# Patient Record
Sex: FEMALE | Race: WHITE | NOT HISPANIC OR LATINO | Employment: FULL TIME | URBAN - NONMETROPOLITAN AREA
[De-identification: names, ages, dates, MRNs, and addresses within clinical notes are randomized per-mention and may not be internally consistent; named-entity substitution may affect disease eponyms.]

---

## 2023-03-10 ENCOUNTER — TELEPHONE (OUTPATIENT)
Dept: PRIMARY CARE | Facility: CLINIC | Age: 58
End: 2023-03-10

## 2023-03-10 NOTE — TELEPHONE ENCOUNTER
Patient called in she needs a refill on Levothyroxine Sodium  and Vitamin D weekly 5000 ut sent to [facility] in [place].  Please call when they have been sent over [phone]

## 2023-03-15 PROBLEM — Z23 NEED FOR SHINGLES VACCINE: Status: ACTIVE | Noted: 2023-03-15

## 2023-03-15 PROBLEM — E66.9 CLASS 1 OBESITY WITH BODY MASS INDEX (BMI) OF 31.0 TO 31.9 IN ADULT: Status: ACTIVE | Noted: 2023-03-15

## 2023-03-15 PROBLEM — N60.01 BREAST CYST, RIGHT: Status: ACTIVE | Noted: 2023-03-15

## 2023-03-15 PROBLEM — R93.89 ABNORMAL CXR (CHEST X-RAY): Status: ACTIVE | Noted: 2023-03-15

## 2023-03-15 PROBLEM — R94.31 ABNORMAL EKG: Status: ACTIVE | Noted: 2023-03-15

## 2023-03-15 PROBLEM — E66.9 CLASS 1 OBESITY WITH BODY MASS INDEX (BMI) OF 33.0 TO 33.9 IN ADULT: Status: ACTIVE | Noted: 2023-03-15

## 2023-03-15 PROBLEM — E66.811 CLASS 1 OBESITY WITH BODY MASS INDEX (BMI) OF 31.0 TO 31.9 IN ADULT: Status: ACTIVE | Noted: 2023-03-15

## 2023-03-15 PROBLEM — R92.8 ABNORMAL MAMMOGRAM: Status: ACTIVE | Noted: 2023-03-15

## 2023-03-15 PROBLEM — K57.32 DIVERTICULITIS, COLON: Status: ACTIVE | Noted: 2023-03-15

## 2023-03-15 PROBLEM — E03.9 HYPOTHYROID: Status: ACTIVE | Noted: 2023-03-15

## 2023-03-15 PROBLEM — E66.811 CLASS 1 OBESITY WITH BODY MASS INDEX (BMI) OF 33.0 TO 33.9 IN ADULT: Status: ACTIVE | Noted: 2023-03-15

## 2023-03-15 PROBLEM — E55.9 VITAMIN D DEFICIENCY: Status: ACTIVE | Noted: 2023-03-15

## 2023-03-15 PROBLEM — Z20.822 SUSPECTED COVID-19 VIRUS INFECTION: Status: ACTIVE | Noted: 2023-03-15

## 2023-03-15 PROBLEM — N28.89 RENAL MASS: Status: ACTIVE | Noted: 2023-03-15

## 2023-03-15 RX ORDER — ALBUTEROL SULFATE 90 UG/1
AEROSOL, METERED RESPIRATORY (INHALATION)
COMMUNITY
Start: 2015-08-26

## 2023-03-15 RX ORDER — ACETAMINOPHEN 500 MG
1 TABLET ORAL DAILY
COMMUNITY
Start: 2022-06-21

## 2023-03-15 RX ORDER — ERGOCALCIFEROL 1.25 MG/1
1 CAPSULE ORAL
COMMUNITY
Start: 2022-06-22 | End: 2023-09-22 | Stop reason: WASHOUT

## 2023-03-15 RX ORDER — LEVOTHYROXINE SODIUM 50 UG/1
1 TABLET ORAL DAILY
COMMUNITY
Start: 2022-01-20 | End: 2023-05-04 | Stop reason: SDUPTHER

## 2023-03-15 NOTE — PROGRESS NOTES
Subjective   Patient ID: Caprice Cuenca is a 58 y.o. female who presents for No chief complaint on file..    HPI       Pt is here for f/up thyroid, vit d and elevated BMI      Which cardiologist did pt see for her abnormal EKG?___      She is doing well       8/2019 colonoscopy Dr Alberts- when is she due?_____  Mother had colon cancer      Due for right breast ultrasound- Dx right breast cyst       Due for lab      GYN Dr Veras    Has pt had DXA scan from Dr Veras?_____      Tdap 2016      Has had one Shingrix         Review of Systems    Objective   There were no vitals taken for this visit.    Physical Exam      Pt is A and O x3, NAD  Head- normocephalic and atraumatic,   EYES- conjunctiva- normal   lids- normal  EARS/NOSE- TM's normal, nasopharynx- normal and atraumatic  OROPHARYNX- normal  NECK- supple, FROM  THYROID- NT, normal size, no nodule noted  LYMPH- no cervical lymph nodes palpated   CV- RRR without murmur  PULM- CTA bilaterally, normal respiratory effort  RESPIRATORY EFFORT- normal , no retractions or nasal flaring   ABD- normoactive BS's , soft , NT, no hepatosplenomegaly palpated  EXT- no edema,NT  SKIN- no abnormal skin lesions noted  NEURO- no focal deficits  PSYCH- pleasant, normal judgement and insight       The number and complexity of problems addressed is considered low.  The amount and/or complexity of data reviewed and analyzed is considered low. The risk of complications and/or morbidity/mortality of patient is considered low. Overall, this patient encounter is considered a low risk visit.            Assessment/Plan       Ultrasound ordered         Ordered lab        Follow up in 12 months

## 2023-03-17 ENCOUNTER — OFFICE VISIT (OUTPATIENT)
Dept: PRIMARY CARE | Facility: CLINIC | Age: 58
End: 2023-03-17
Payer: COMMERCIAL

## 2023-03-17 VITALS
OXYGEN SATURATION: 97 % | SYSTOLIC BLOOD PRESSURE: 127 MMHG | WEIGHT: 216.9 LBS | BODY MASS INDEX: 35.01 KG/M2 | DIASTOLIC BLOOD PRESSURE: 84 MMHG | TEMPERATURE: 97.6 F | HEART RATE: 68 BPM

## 2023-03-17 DIAGNOSIS — H93.13 TINNITUS AURIUM, BILATERAL: ICD-10-CM

## 2023-03-17 DIAGNOSIS — R94.31 ABNORMAL EKG: ICD-10-CM

## 2023-03-17 DIAGNOSIS — Z11.59 ENCOUNTER FOR HEPATITIS C SCREENING TEST FOR LOW RISK PATIENT: ICD-10-CM

## 2023-03-17 DIAGNOSIS — Z13.820 SCREENING FOR OSTEOPOROSIS: ICD-10-CM

## 2023-03-17 DIAGNOSIS — N60.01 BREAST CYST, RIGHT: Primary | ICD-10-CM

## 2023-03-17 DIAGNOSIS — E03.9 HYPOTHYROIDISM, UNSPECIFIED TYPE: ICD-10-CM

## 2023-03-17 DIAGNOSIS — Z13.1 SCREENING FOR DIABETES MELLITUS: ICD-10-CM

## 2023-03-17 DIAGNOSIS — E55.9 VITAMIN D DEFICIENCY: ICD-10-CM

## 2023-03-17 DIAGNOSIS — Z13.220 NEED FOR LIPID SCREENING: ICD-10-CM

## 2023-03-17 PROBLEM — Z12.11 SCREEN FOR COLON CANCER: Status: ACTIVE | Noted: 2023-03-17

## 2023-03-17 PROCEDURE — 99214 OFFICE O/P EST MOD 30 MIN: CPT | Performed by: FAMILY MEDICINE

## 2023-03-17 PROCEDURE — 1036F TOBACCO NON-USER: CPT | Performed by: FAMILY MEDICINE

## 2023-03-17 NOTE — PROGRESS NOTES
Subjective   Patient ID: Caprice Cuenca is a 58 y.o. female who presents for Follow-up (Follow up meds, levothyroxine, states doing well no complaints).    HPI       Pt is here for f/up thyroid, vit d and elevated BMI      Which cardiologist did pt see for her abnormal EKG?___  Forgot to make appt.    She is doing well   Pt c/o sudden onset tinnitis L>R       8/2019 colonoscopy Dr Alberts- when is she due?_____  Mother had colon cancer      Due for right breast ultrasound- Dx right breast cyst       Due for lab      GYN Dr Veras    Has pt had DXA scan from Dr Veras?___no __      Tdap 2016      Has had one Shingrix - pt will return at later date         Review of Systems    Objective   /84 (BP Location: Right arm, Patient Position: Sitting, BP Cuff Size: Adult)   Pulse 68   Temp 36.4 °C (97.6 °F) (Temporal)   Wt 98.4 kg (216 lb 14.4 oz)   SpO2 97%   BMI 35.01 kg/m²     Physical Exam      Pt is A and O x3, NAD  Head- normocephalic and atraumatic,   EYES- conjunctiva- normal   lids- normal  EARS/NOSE- TM's normal, nasopharynx- normal and atraumatic  OROPHARYNX- normal  NECK- supple, FROM  THYROID- NT, normal size, no nodule noted  LYMPH- no cervical lymph nodes palpated   CV- RRR without murmur  PULM- CTA bilaterally, normal respiratory effort  RESPIRATORY EFFORT- normal , no retractions or nasal flaring   ABD- normoactive BS's , soft , NT, no hepatosplenomegaly palpated  EXT- no edema,NT  SKIN- no abnormal skin lesions noted  NEURO- no focal deficits  PSYCH- pleasant, normal judgement and insight       The number and complexity of problems addressed is considered low.  The amount and/or complexity of data reviewed and analyzed is considered low. The risk of complications and/or morbidity/mortality of patient is considered low. Overall, this patient encounter is considered a low risk visit.            Assessment/Plan       Ultrasound ordered         Ordered lab        Ordered DXA           Refer to Cardiologist        Refer ENT         Follow up in 6 months

## 2023-03-20 ENCOUNTER — LAB (OUTPATIENT)
Dept: LAB | Facility: LAB | Age: 58
End: 2023-03-20
Payer: COMMERCIAL

## 2023-03-20 DIAGNOSIS — Z13.1 SCREENING FOR DIABETES MELLITUS: ICD-10-CM

## 2023-03-20 DIAGNOSIS — Z13.220 NEED FOR LIPID SCREENING: ICD-10-CM

## 2023-03-20 DIAGNOSIS — E03.9 HYPOTHYROIDISM, UNSPECIFIED TYPE: ICD-10-CM

## 2023-03-20 DIAGNOSIS — E55.9 VITAMIN D DEFICIENCY: ICD-10-CM

## 2023-03-20 LAB
CALCIDIOL (25 OH VITAMIN D3) (NG/ML) IN SER/PLAS: 31 NG/ML
CHOLESTEROL (MG/DL) IN SER/PLAS: 190 MG/DL (ref 0–199)
CHOLESTEROL IN HDL (MG/DL) IN SER/PLAS: 57 MG/DL
CHOLESTEROL/HDL RATIO: 3.3
GLUCOSE (MG/DL) IN SER/PLAS: 99 MG/DL (ref 74–99)
LDL: 100 MG/DL (ref 0–99)
THYROTROPIN (MIU/L) IN SER/PLAS BY DETECTION LIMIT <= 0.05 MIU/L: 2.84 MIU/L (ref 0.44–3.98)
TRIGLYCERIDE (MG/DL) IN SER/PLAS: 164 MG/DL (ref 0–149)
VLDL: 33 MG/DL (ref 0–40)

## 2023-03-20 PROCEDURE — 80061 LIPID PANEL: CPT

## 2023-03-20 PROCEDURE — 36415 COLL VENOUS BLD VENIPUNCTURE: CPT

## 2023-03-20 PROCEDURE — 82306 VITAMIN D 25 HYDROXY: CPT

## 2023-03-20 PROCEDURE — 84443 ASSAY THYROID STIM HORMONE: CPT

## 2023-03-20 PROCEDURE — 82947 ASSAY GLUCOSE BLOOD QUANT: CPT

## 2023-04-21 ENCOUNTER — TELEPHONE (OUTPATIENT)
Dept: PRIMARY CARE | Facility: CLINIC | Age: 58
End: 2023-04-21

## 2023-04-21 NOTE — TELEPHONE ENCOUNTER
The patient while scheduling her referrals needs an updated referral for the Bilateral US Breast referral. It has since .  The patient is also asking about her Thyroid medication. She realizes her levels were normal,but not sure if she should be taking her medication?    Please call patient at the number listed

## 2023-04-24 RX ORDER — LEVOTHYROXINE SODIUM 50 UG/1
50 TABLET ORAL DAILY
Qty: 90 TABLET | Refills: 1 | Status: CANCELLED | OUTPATIENT
Start: 2023-04-24

## 2023-04-24 NOTE — TELEPHONE ENCOUNTER
Spoke with patient and gave her information, refill needed for thyroid meds and that was sent to pcp

## 2023-05-04 DIAGNOSIS — E03.9 HYPOTHYROIDISM, UNSPECIFIED TYPE: Primary | ICD-10-CM

## 2023-05-04 RX ORDER — LEVOTHYROXINE SODIUM 50 UG/1
50 TABLET ORAL DAILY
Qty: 90 TABLET | Refills: 1 | Status: SHIPPED | OUTPATIENT
Start: 2023-05-04 | End: 2023-09-22 | Stop reason: SDUPTHER

## 2023-07-28 DIAGNOSIS — Z12.31 ENCOUNTER FOR SCREENING MAMMOGRAM FOR MALIGNANT NEOPLASM OF BREAST: Primary | ICD-10-CM

## 2023-09-20 PROBLEM — R94.31 ABNORMAL EKG: Chronic | Status: ACTIVE | Noted: 2023-03-15

## 2023-09-20 PROBLEM — Z11.59 ENCOUNTER FOR HEPATITIS C SCREENING TEST FOR LOW RISK PATIENT: Chronic | Status: ACTIVE | Noted: 2023-03-17

## 2023-09-20 PROBLEM — Z12.11 SCREEN FOR COLON CANCER: Chronic | Status: ACTIVE | Noted: 2023-03-17

## 2023-09-20 NOTE — PROGRESS NOTES
Subjective      HPI:          Caprice Cuenca is a 58 y.o. female 58 y.o. is here today for PE/health maintenance      Chief Complaint   Patient presents with    Annual Exam    Immunizations     Declined.  Will get at a later time.     Did pt have the DXA study done yet?  No     due Shingrix #2- declines today      History Breast cyst- had ultrasound July      Due mammo        Tdap 2016      Pt also due for f/up chronic medical problems-thyroid, elevated BMI    Other medical specialists are involved in patient's care.    Any recent notes that were available were reviewed.    All conditions are monitored, evaluated and assessed regularly.    Patient is compliant with current treatment regimen/medications.    Specialists involved include:      Dr Veras-GYN      Dr Baires- tinnitus    Dr Alberts- 2019 colonoscopy- (mother history colon cancer)       USPTFS recommend  laboratory  screening for HIV in patients ages 15-65        Health Maintenance Topics with due status: Overdue       Topic Date Due    Yearly Adult Physical Never done    Hepatitis B Vaccines Never done    HIV Screening Never done    MMR Vaccines Never done    Cervical Cancer Screening Never done    Zoster Vaccines 08/10/2021    COVID-19 Vaccine 07/23/2022    Mammogram 08/02/2023    Influenza Vaccine 09/01/2023     Health Maintenance Topics with due status: Not Due       Topic Last Completion Date    DTaP/Tdap/Td Vaccines 12/02/2016    Colorectal Cancer Screening 08/29/2019    TSH Level 03/20/2023    Lipid Panel 03/20/2023    Diabetes Screening 03/20/2023     Health Maintenance Topics with due status: Completed       Topic Last Completion Date    Hepatitis C Screening 12/09/2019     Health Maintenance Topics with due status: Aged Out       Topic Date Due    HIB Vaccines Aged Out    IPV Vaccines Aged Out    Hepatitis A Vaccines Aged Out    Meningococcal Vaccine Aged Out    Rotavirus Vaccines Aged Out    HPV Vaccines Aged Out    Pneumococcal Vaccine: Pediatrics  (0 to 5 Years) and At-Risk Patients (6 to 64 Years) Aged Out     Health Maintenance Topics with due status: Discontinued       Topic Date Due    Irritable Bowel Syndrome Discontinued                 Immunization History   Administered Date(s) Administered    Influenza, seasonal, injectable 01/20/2022    Moderna SARS-CoV-2 Vaccination 11/20/2021, 05/28/2022    Pfizer Purple Cap SARS-CoV-2 03/19/2021, 04/08/2021    Tdap vaccine, age 7 year and older (BOOSTRIX) 12/02/2016    Zoster vaccine, recombinant, adult (SHINGRIX) 06/15/2021         Social History     Tobacco Use   Smoking Status Never    Passive exposure: Past   Smokeless Tobacco Never                reports current alcohol use of about 2.0 standard drinks of alcohol per week.       [facility] Encounter on 05/16/2023   Component Date Value Ref Range Status    Ventricular Rate 05/16/2023 68  BPM Final    Atrial Rate 05/16/2023 68  BPM Final    DC Interval 05/16/2023 154  ms Final    QRS Duration 05/16/2023 80  ms Final    QT Interval 05/16/2023 370  ms Final    QTC Calculation(Bazett) 05/16/2023 393  ms Final    P Axis 05/16/2023 45  degrees Final    R Axis 05/16/2023 42  degrees Final    T Axis 05/16/2023 33  degrees Final    QRS Count 05/16/2023 11  beats Final    Q Onset 05/16/2023 226  ms Final    P Onset 05/16/2023 149  ms Final    P Offset 05/16/2023 201  ms Final    T Offset 05/16/2023 411  ms Final    QTC Fredericia 05/16/2023 386  ms Final   Lab on 03/20/2023   Component Date Value Ref Range Status    Glucose 03/20/2023 99  74 - 99 mg/dL Final    Cholesterol 03/20/2023 190  0 - 199 mg/dL Final    .      AGE      DESIRABLE   BORDERLINE HIGH   HIGH     0-19 Y     0 - 169       170 - 199     >/= 200    20-24 Y     0 - 189       190 - 224     >/= 225         >24 Y     0 - 199       200 - 239     >/= 240   **All ranges are based on fasting samples. Specific   therapeutic targets will vary based on patient-specific   cardiac risk.  .   Pediatric guidelines  reference:Pediatrics 2011, 128(S5).   Adult guidelines reference: NCEP ATPIII Guidelines,     LAUREN 2001, 258:4576-53  .   Venipuncture immediately after or during the    administration of Metamizole may lead to falsely   low results. Testing should be performed immediately   prior to Metamizole dosing.    HDL 03/20/2023 57.0  mg/dL Final    .      AGE      VERY LOW   LOW     NORMAL    HIGH       0-19 Y       < 35   < 40     40-45     ----    20-24 Y       ----   < 40       >45     ----      >24 Y       ----   < 40     40-60      >60  .    Cholesterol/HDL Ratio 03/20/2023 3.3   Final    REF VALUES  DESIRABLE  < 3.4  HIGH RISK  > 5.0    LDL 03/20/2023 100 (H)  0 - 99 mg/dL Final    .                           NEAR      BORD      AGE      DESIRABLE  OPTIMAL    HIGH     HIGH     VERY HIGH     0-19 Y     0 - 109     ---    110-129   >/= 130     ----    20-24 Y     0 - 119     ---    120-159   >/= 160     ----      >24 Y     0 -  99   100-129  130-159   160-189     >/=190  .    VLDL 03/20/2023 33  0 - 40 mg/dL Final    Triglycerides 03/20/2023 164 (H)  0 - 149 mg/dL Final    .      AGE      DESIRABLE   BORDERLINE HIGH   HIGH     VERY HIGH   0 D-90 D    19 - 174         ----         ----        ----  91 D- 9 Y     0 -  74        75 -  99     >/= 100      ----    10-19 Y     0 -  89        90 - 129     >/= 130      ----    20-24 Y     0 - 114       115 - 149     >/= 150      ----         >24 Y     0 - 149       150 - 199    200- 499    >/= 500  .   Venipuncture immediately after or during the    administration of Metamizole may lead to falsely   low results. Testing should be performed immediately   prior to Metamizole dosing.    Vitamin D, 25-Hydroxy 03/20/2023 31  ng/mL Final    .  DEFICIENCY:         < 20   NG/ML  INSUFFICIENCY:      20-29  NG/ML  SUFFICIENCY:         NG/ML    THIS ASSAY ACCURATELY QUANTIFIES THE SUM OF  VITAMIN D3, 25-HYDROXY AND VIT D2,25-HYDROXY.    TSH 03/20/2023 2.84  0.44 - 3.98 mIU/L Final  "    TSH testing is performed using different testing    methodology at [facility] than at other    [facility]. Direct result comparisons should    only be made within the same method.             Current Outpatient Medications:     albuterol 90 mcg/actuation inhaler, INHALE 1 TO 2 PUFFS EVERY 4 TO 6 HOURS AS NEEDED., Disp: , Rfl:     cholecalciferol (Vitamin D-3) 50 mcg (2,000 unit) capsule, Take 1 capsule (50 mcg) by mouth once daily., Disp: , Rfl:     levothyroxine (Synthroid, Levoxyl) 50 mcg tablet, Take 1 tablet (50 mcg) by mouth once daily., Disp: 90 tablet, Rfl: 1      ROS:            Objective        PE:    Vitals:    09/22/23 0807   BP: 110/74   BP Location: Left arm   Patient Position: Sitting   BP Cuff Size: Large adult   Pulse: 66   Temp: 36.2 °C (97.1 °F)   TempSrc: Temporal   SpO2: 96%   Weight: 99.8 kg (220 lb)   Height: 1.676 m (5' 6\")               Pt is A and O x3, NAD  Head- normocephalic and atraumatic,   EYES- conjunctiva- normal   lids- normal  EARS/NOSE- TM's normal, nasopharynx- normal and atraumatic  OROPHARYNX- normal  NECK- supple, FROM  THYROID- NT, normal size, no nodule noted  LYMPH- no cervical lymph nodes palpated   CV- RRR without murmur  PULM- CTA bilaterally, normal respiratory effort  RESPIRATORY EFFORT- normal , no retractions or nasal flaring   ABD- normoactive BS's , soft , NT, no hepatosplenomegaly palpated  EXT- no edema,NT  SKIN- no abnormal skin lesions noted  NEURO- no focal deficits  PSYCH- pleasant, normal judgement and insight    BP Readings from Last 3 Encounters:   09/22/23 110/74   05/16/23 132/88   03/17/23 127/84         Wt Readings from Last 3 Encounters:   09/22/23 99.8 kg (220 lb)   05/16/23 97.9 kg (215 lb 12.8 oz)   03/17/23 98.4 kg (216 lb 14.4 oz)         BMI Readings from Last 3 Encounters:   09/22/23 35.51 kg/m²   05/16/23 34.83 kg/m²   03/17/23 35.01 kg/m²       The number and complexity of problems addressed is considered moderate.  " The amount and/or complexity of data reviewed and analyzed is considered moderate. The risk of complications and/or morbidity/mortality of patient is considered moderate. Overall, this patient encounter is considered a moderate risk visit.    Patient's BMI is elevated.  Plan- diet and exercise- BMI is elevated. Need to increase activity on a daily basis especially walking.  Monitor  total calories per day- decrease carbohydrates and fats. Goal - lose 1-2 pounds per week.    Recommend 150 minutes of moderate-intensity exercise as tolerated per week and 2-3 days of resistance, flexibility, and neuromotor exercises per week.    Normal BMI- 18.5-25    Overweight=  BMI 26-29    Obese= BMI 30-39    Morbidly Obese = BMI >40        Assessment/Plan      Problem List Items Addressed This Visit          Active Problems    Hypothyroid     Other Visit Diagnoses       Well adult exam    -  Primary    Immunization due        Screening for diabetes mellitus        Screening for cholesterol level        Screening for HIV (human immunodeficiency virus)        Encounter for screening mammogram for malignant neoplasm of breast                No orders of the defined types were placed in this encounter.            Follow up 6 months - thyroid            Recommendations for women annual wellness exam:   Make sure screenings for cervical and breast cancer are up to date if applicable- pap smears age 21-65  mammogram starting at age 35- 40 or sooner if positive family history of breast cancer   colonoscopy at age 45, earlier if positive family history for breast or colon cancer   Screen for osteoporosis with DXA bone scan starting at age 65 or sooner if risk factors present (long term steroid use, smoking, heavy alcohol use, history of fracture, rheumatoid arthritis, low body weight, family history of hip fracture)  Screening for lung cancer with low-dose CT in all adults age 50-80 who have a 20 pack-year smoking history and currently  smoke or have quit within the past 15 years; and are symptom free/no prior pulmonary diagnosis  Follow a healthy diet (Examples, Dash diet, Mediterranean diet)  Exercise 150 minutes per week   Maintain healthy weight (BMI < 25)  Do not smoke   Alcohol in moderation (up to 1 drink/day)  Get enough sleep (7-8 hours/night)  Take a prenatal vitamin with folic acid if possibility of pregnancy   Make sure immunizations are up to date   Recommend minimum 1,000 mg calcium and 600-800 IU vitamin D daily (combination of diet + supplement)- unless there is a contraindication   Visit dentist twice yearly

## 2023-09-22 ENCOUNTER — LAB (OUTPATIENT)
Dept: LAB | Facility: LAB | Age: 58
End: 2023-09-22
Payer: COMMERCIAL

## 2023-09-22 ENCOUNTER — OFFICE VISIT (OUTPATIENT)
Dept: PRIMARY CARE | Facility: CLINIC | Age: 58
End: 2023-09-22
Payer: COMMERCIAL

## 2023-09-22 VITALS
SYSTOLIC BLOOD PRESSURE: 110 MMHG | DIASTOLIC BLOOD PRESSURE: 74 MMHG | BODY MASS INDEX: 35.36 KG/M2 | TEMPERATURE: 97.1 F | HEART RATE: 66 BPM | HEIGHT: 66 IN | WEIGHT: 220 LBS | OXYGEN SATURATION: 96 %

## 2023-09-22 DIAGNOSIS — Z00.00 WELL ADULT EXAM: Primary | ICD-10-CM

## 2023-09-22 DIAGNOSIS — M81.0 AGE RELATED OSTEOPOROSIS, UNSPECIFIED PATHOLOGICAL FRACTURE PRESENCE: ICD-10-CM

## 2023-09-22 DIAGNOSIS — Z11.4 SCREENING FOR HIV (HUMAN IMMUNODEFICIENCY VIRUS): ICD-10-CM

## 2023-09-22 DIAGNOSIS — Z13.220 SCREENING FOR CHOLESTEROL LEVEL: ICD-10-CM

## 2023-09-22 DIAGNOSIS — Z13.1 SCREENING FOR DIABETES MELLITUS: ICD-10-CM

## 2023-09-22 DIAGNOSIS — Z23 IMMUNIZATION DUE: ICD-10-CM

## 2023-09-22 DIAGNOSIS — E03.9 HYPOTHYROIDISM, UNSPECIFIED TYPE: ICD-10-CM

## 2023-09-22 DIAGNOSIS — Z12.31 ENCOUNTER FOR SCREENING MAMMOGRAM FOR MALIGNANT NEOPLASM OF BREAST: ICD-10-CM

## 2023-09-22 LAB
CHOLESTEROL (MG/DL) IN SER/PLAS: 198 MG/DL (ref 0–199)
CHOLESTEROL IN HDL (MG/DL) IN SER/PLAS: 59.3 MG/DL
CHOLESTEROL/HDL RATIO: 3.3
GLUCOSE (MG/DL) IN SER/PLAS: 91 MG/DL (ref 74–99)
HIV 1/ 2 AG/AB SCREEN: NONREACTIVE
LDL: 112 MG/DL (ref 0–99)
THYROTROPIN (MIU/L) IN SER/PLAS BY DETECTION LIMIT <= 0.05 MIU/L: 2.53 MIU/L (ref 0.44–3.98)
TRIGLYCERIDE (MG/DL) IN SER/PLAS: 136 MG/DL (ref 0–149)
VLDL: 27 MG/DL (ref 0–40)

## 2023-09-22 PROCEDURE — 99396 PREV VISIT EST AGE 40-64: CPT | Performed by: FAMILY MEDICINE

## 2023-09-22 PROCEDURE — 87389 HIV-1 AG W/HIV-1&-2 AB AG IA: CPT

## 2023-09-22 PROCEDURE — 82947 ASSAY GLUCOSE BLOOD QUANT: CPT

## 2023-09-22 PROCEDURE — 36415 COLL VENOUS BLD VENIPUNCTURE: CPT

## 2023-09-22 PROCEDURE — 80061 LIPID PANEL: CPT

## 2023-09-22 PROCEDURE — 84443 ASSAY THYROID STIM HORMONE: CPT

## 2023-09-22 PROCEDURE — 1036F TOBACCO NON-USER: CPT | Performed by: FAMILY MEDICINE

## 2023-09-22 RX ORDER — LEVOTHYROXINE SODIUM 50 UG/1
50 TABLET ORAL DAILY
Qty: 90 TABLET | Refills: 1 | Status: SHIPPED | OUTPATIENT
Start: 2023-09-22 | End: 2024-06-03 | Stop reason: SDUPTHER

## 2023-09-25 PROBLEM — Z11.4 SCREENING FOR HIV (HUMAN IMMUNODEFICIENCY VIRUS): Chronic | Status: ACTIVE | Noted: 2023-09-25

## 2023-09-25 PROBLEM — Z11.4 SCREENING FOR HIV (HUMAN IMMUNODEFICIENCY VIRUS): Status: ACTIVE | Noted: 2023-09-25

## 2024-03-20 NOTE — PROGRESS NOTES
Subjective        Caprice Cuenca. Is 59 y.o.. female. Here for follow up office visit-       Chief Complaint   Patient presents with    Follow-up    Thyroid Problem    vitamine d deficiency    BMI       HPI:    Has pt had testing done-  DXA-  no  Mammo- yes          Follow up for thyroid, vit d , elevated BMI            Pt is doing well      Due for lab       Had COVID in Nov 2023        Discussed walking daily    Discussed diet      Due for Shingrix #2- will wait - daughter visiting       Lab on 09/22/2023   Component Date Value Ref Range Status    TSH 09/22/2023 2.53  0.44 - 3.98 mIU/L Final     TSH testing is performed using different testing    methodology at [facility] than at other    [facility]. Direct result comparisons should    only be made within the same method.    Glucose 09/22/2023 91  74 - 99 mg/dL Final    Cholesterol 09/22/2023 198  0 - 199 mg/dL Final    .      AGE      DESIRABLE   BORDERLINE HIGH   HIGH     0-19 Y     0 - 169       170 - 199     >/= 200    20-24 Y     0 - 189       190 - 224     >/= 225         >24 Y     0 - 199       200 - 239     >/= 240   **All ranges are based on fasting samples. Specific   therapeutic targets will vary based on patient-specific   cardiac risk.  .   Pediatric guidelines reference:Pediatrics 2011, 128(S5).   Adult guidelines reference: NCEP ATPIII Guidelines,     LAUREN 2001, 258:2486-97  .   Venipuncture immediately after or during the    administration of Metamizole may lead to falsely   low results. Testing should be performed immediately   prior to Metamizole dosing.    HDL 09/22/2023 59.3  mg/dL Final    .      AGE      VERY LOW   LOW     NORMAL    HIGH       0-19 Y       < 35   < 40     40-45     ----    20-24 Y       ----   < 40       >45     ----      >24 Y       ----   < 40     40-60      >60  .    Cholesterol/HDL Ratio 09/22/2023 3.3   Final    REF VALUES  DESIRABLE  < 3.4  HIGH RISK  > 5.0    LDL 09/22/2023 112 (H)  0 - 99 mg/dL Final     .                           NEAR      BORD      AGE      DESIRABLE  OPTIMAL    HIGH     HIGH     VERY HIGH     0-19 Y     0 - 109     ---    110-129   >/= 130     ----    20-24 Y     0 - 119     ---    120-159   >/= 160     ----      >24 Y     0 -  99   100-129  130-159   160-189     >/=190  .    VLDL 09/22/2023 27  0 - 40 mg/dL Final    Triglycerides 09/22/2023 136  0 - 149 mg/dL Final    .      AGE      DESIRABLE   BORDERLINE HIGH   HIGH     VERY HIGH   0 D-90 D    19 - 174         ----         ----        ----  91 D- 9 Y     0 -  74        75 -  99     >/= 100      ----    10-19 Y     0 -  89        90 - 129     >/= 130      ----    20-24 Y     0 - 114       115 - 149     >/= 150      ----         >24 Y     0 - 149       150 - 199    200- 499    >/= 500  .   Venipuncture immediately after or during the    administration of Metamizole may lead to falsely   low results. Testing should be performed immediately   prior to Metamizole dosing.    HIV 1 and 2 Screen 09/22/2023 NONREACTIVE  NONREACTIVE Final     HIV Ag/Ab screen is performed using the Siem[employer]   HIV Ag/Ab Combo assay which detects the presence of HIV    p24 antigen as well as antibodies to HIV-1   (Group M and O) and HIV-2.  .  No laboratory evidence of HIV infection. If acute HIV infection is   suspected, consider testing for HIV RNA by PCR (viral load).   [facility] Encounter on 05/16/2023   Component Date Value Ref Range Status    Ventricular Rate 05/16/2023 68  BPM Final    Atrial Rate 05/16/2023 68  BPM Final    ME Interval 05/16/2023 154  ms Final    QRS Duration 05/16/2023 80  ms Final    QT Interval 05/16/2023 370  ms Final    QTC Calculation(Bazett) 05/16/2023 393  ms Final    P Axis 05/16/2023 45  degrees Final    R Axis 05/16/2023 42  degrees Final    T Axis 05/16/2023 33  degrees Final    QRS Count 05/16/2023 11  beats Final    Q Onset 05/16/2023 226  ms Final    P Onset 05/16/2023 149  ms Final    P Offset 05/16/2023 201  ms Final    T  Offset 05/16/2023 411  ms Final    QTC Fredericia 05/16/2023 386  ms Final         REVIEW OF SYSTEMS:        Objective      Vitals:    03/22/24 0914   BP: 110/78   BP Location: Left arm   Patient Position: Sitting   BP Cuff Size: Large adult   Pulse: 71   Temp: 37.2 °C (99 °F)   TempSrc: Temporal   SpO2: 95%   Weight: 97.3 kg (214 lb 8 oz)                       PHYSICAL:      Patient is alert and oriented x 3 , NAD  HEAD- normocephalic and atraumatic   EYES-conjunctiva-normal, lids - normal  EARS/NOSE- normal external exam   NECK-supple,FROM  CV- RRR without murmur  PULM- CTA bilaterally, normal respiratory effort  RESPIRATORY EFFORT- normal , no retractions or nasal flaring   ABD- normoactive BS's   EXT- no edema,NT  SKIN- no abnormal skin lesions noted  NEURO- no focal deficits  PSYCH- pleasant, normal judgement and insight      BP Readings from Last 3 Encounters:   03/22/24 110/78   09/22/23 110/74   05/16/23 132/88             Wt Readings from Last 3 Encounters:   03/22/24 97.3 kg (214 lb 8 oz)   09/22/23 99.8 kg (220 lb)   05/16/23 97.9 kg (215 lb 12.8 oz)           BMI Readings from Last 3 Encounters:   03/22/24 34.62 kg/m²   09/22/23 35.51 kg/m²   05/16/23 34.83 kg/m²               The number and complexity of problems addressed is considered moderate.  The amount and/or complexity of data reviewed and analyzed is considered moderate. The risk of complications and/or morbidity/mortality of patient is considered moderate. Overall, this patient encounter is considered a moderate risk visit.    Patient's BMI is elevated.  Plan- diet and exercise- BMI is elevated. Need to increase activity on a daily basis especially walking.  Monitor  total calories per day- decrease carbohydrates and fats. Goal - lose 1-2 pounds per week.    Recommend 150 minutes of moderate-intensity exercise as tolerated per week and 2-3 days of resistance, flexibility, and neuromotor exercises per week.    Normal BMI- 18.5-25    Overweight=   BMI 26-29    Obese= BMI 30-39    Morbidly Obese = BMI >40          Assessment/Plan      Problem List Items Addressed This Visit          Active Problems    Class 2 obesity due to excess calories with body mass index (BMI) of 35.0 to 35.9 in adult (Chronic)    Hypothyroid - Primary    Relevant Orders    Thyroid Stimulating Hormone    Follow Up In [facility] - PCP - Established    Immunization due (Chronic)    Vitamin D deficiency    Relevant Orders    Vitamin D 25-Hydroxy,Total (for eval of Vitamin D levels)       Orders Placed This Encounter   Procedures    Thyroid Stimulating Hormone    Vitamin D 25-Hydroxy,Total (for eval of Vitamin D levels)       Increase activity          Continue medication      Ordered lab      Follow up in 6 months

## 2024-03-22 ENCOUNTER — LAB (OUTPATIENT)
Dept: LAB | Facility: LAB | Age: 59
End: 2024-03-22
Payer: COMMERCIAL

## 2024-03-22 ENCOUNTER — OFFICE VISIT (OUTPATIENT)
Dept: PRIMARY CARE | Facility: CLINIC | Age: 59
End: 2024-03-22
Payer: COMMERCIAL

## 2024-03-22 VITALS
BODY MASS INDEX: 34.62 KG/M2 | HEART RATE: 71 BPM | WEIGHT: 214.5 LBS | TEMPERATURE: 99 F | DIASTOLIC BLOOD PRESSURE: 78 MMHG | OXYGEN SATURATION: 95 % | SYSTOLIC BLOOD PRESSURE: 110 MMHG

## 2024-03-22 DIAGNOSIS — E03.9 HYPOTHYROIDISM, UNSPECIFIED TYPE: Chronic | ICD-10-CM

## 2024-03-22 DIAGNOSIS — E03.9 HYPOTHYROIDISM, UNSPECIFIED TYPE: Primary | Chronic | ICD-10-CM

## 2024-03-22 DIAGNOSIS — Z23 IMMUNIZATION DUE: Chronic | ICD-10-CM

## 2024-03-22 DIAGNOSIS — E66.09 CLASS 2 OBESITY DUE TO EXCESS CALORIES WITH BODY MASS INDEX (BMI) OF 35.0 TO 35.9 IN ADULT, UNSPECIFIED WHETHER SERIOUS COMORBIDITY PRESENT: Chronic | ICD-10-CM

## 2024-03-22 DIAGNOSIS — E55.9 VITAMIN D DEFICIENCY: Chronic | ICD-10-CM

## 2024-03-22 PROBLEM — E66.812 CLASS 2 OBESITY DUE TO EXCESS CALORIES WITH BODY MASS INDEX (BMI) OF 35.0 TO 35.9 IN ADULT: Chronic | Status: ACTIVE | Noted: 2024-03-22

## 2024-03-22 PROCEDURE — 99214 OFFICE O/P EST MOD 30 MIN: CPT | Performed by: FAMILY MEDICINE

## 2024-03-22 PROCEDURE — 3008F BODY MASS INDEX DOCD: CPT | Performed by: FAMILY MEDICINE

## 2024-03-22 PROCEDURE — 82306 VITAMIN D 25 HYDROXY: CPT

## 2024-03-22 PROCEDURE — 84443 ASSAY THYROID STIM HORMONE: CPT

## 2024-03-22 PROCEDURE — 1036F TOBACCO NON-USER: CPT | Performed by: FAMILY MEDICINE

## 2024-03-22 PROCEDURE — 36415 COLL VENOUS BLD VENIPUNCTURE: CPT

## 2024-03-23 LAB
25(OH)D3 SERPL-MCNC: 25 NG/ML (ref 30–100)
TSH SERPL-ACNC: 2.73 MIU/L (ref 0.44–3.98)

## 2024-03-29 ENCOUNTER — OFFICE VISIT (OUTPATIENT)
Dept: PRIMARY CARE | Facility: CLINIC | Age: 59
End: 2024-03-29
Payer: COMMERCIAL

## 2024-03-29 ENCOUNTER — TELEPHONE (OUTPATIENT)
Dept: PRIMARY CARE | Facility: CLINIC | Age: 59
End: 2024-03-29

## 2024-03-29 VITALS
OXYGEN SATURATION: 96 % | WEIGHT: 213.8 LBS | HEART RATE: 70 BPM | BODY MASS INDEX: 34.51 KG/M2 | SYSTOLIC BLOOD PRESSURE: 119 MMHG | DIASTOLIC BLOOD PRESSURE: 78 MMHG | TEMPERATURE: 98.1 F

## 2024-03-29 DIAGNOSIS — J02.9 PHARYNGITIS, UNSPECIFIED ETIOLOGY: Primary | Chronic | ICD-10-CM

## 2024-03-29 PROCEDURE — 87880 STREP A ASSAY W/OPTIC: CPT | Performed by: FAMILY MEDICINE

## 2024-03-29 PROCEDURE — 99214 OFFICE O/P EST MOD 30 MIN: CPT | Performed by: FAMILY MEDICINE

## 2024-03-29 PROCEDURE — 3008F BODY MASS INDEX DOCD: CPT | Performed by: FAMILY MEDICINE

## 2024-03-29 RX ORDER — AMOXICILLIN 500 MG/1
CAPSULE ORAL
Qty: 40 CAPSULE | Refills: 0 | Status: SHIPPED | OUTPATIENT
Start: 2024-03-29

## 2024-03-29 NOTE — TELEPHONE ENCOUNTER
Call to patient re sick visit @ 2:30 today.  Advised to remain in vehicle upon arrival and requested pt arrive 15 minutes prior to appt time.

## 2024-03-29 NOTE — PROGRESS NOTES
Subjective      HPI:    Caprice Herrera Is 59 y.o.. female. Here for acute visit-             Chief Complaint   Patient presents with    Sore Throat    Cough    Headache    Fever    Chills         What concern/ problem/pain/symptom  brings you here today?  S/t, h/a, cough, chills, fever      how long has pt had sxs?  5      describe symptoms-    fever-  yes  nausea- no  vomiting- no        Are symptoms all day ?  yes  Do symptoms occur at night?  yes      has pt tried anything for current symptoms, including medications (OTC or prescription)  ?  Hot tea, Motrin      what makes symptoms worse?  unknown      Does anything make symptoms better?  unknown      has pt been seen recently for this problem ( within past 2-3 weeks) ?  Check up 03/22/2024 with [facility].  No symptoms at that time.            Rapid strep done today- neg         Social History     Tobacco Use   Smoking Status Never    Passive exposure: Past   Smokeless Tobacco Never        reports current alcohol use of about 2.0 standard drinks of alcohol per week.       Objective            Vitals:    03/29/24 1424   BP: 119/78   BP Location: Left arm   Patient Position: Sitting   BP Cuff Size: Adult   Pulse: 70   Temp: 36.7 °C (98.1 °F)   TempSrc: Temporal   SpO2: 96%   Weight: 97 kg (213 lb 12.8 oz)           PHYSICAL:      Pt is A and O x3, NAD, Vital signs are within normal limits  General Appearance- normal , good hygiene, talks easily  EYES- conjunctiva- normal  lids- normal  EARS/NOSE-TM's normal, head normocephalic and atraumatic, nasopharynx-no nasal discharge, no trismus, no hot potato voice  OROPHARYNX- red with mild exudate  NECK- supple, FROM  LYMPH- mild bilateral scattered cervical lymph nodes palpated - tender  CV- RRR without murmur  EXTREMITIES- no edema or varicosities  PULM- CTA bilaterally  Respiratory effort- normal respiratory effort , no retractions or nasal flaring   ABDOMEN- normoactive BS's  SKIN- no abnormal skin lesions on inspection or  palpation   NEURO- no focal deficits  PSYCH- pleasant, normal judgement and insight        BP Readings from Last 3 Encounters:   03/29/24 119/78   03/22/24 110/78   09/22/23 110/74         Wt Readings from Last 3 Encounters:   03/29/24 97 kg (213 lb 12.8 oz)   03/22/24 97.3 kg (214 lb 8 oz)   09/22/23 99.8 kg (220 lb)       BMI Readings from Last 3 Encounters:   03/29/24 34.51 kg/m²   03/22/24 34.62 kg/m²   09/22/23 35.51 kg/m²           The number and complexity of problems addressed is considered moderate.  The amount and/or complexity of data reviewed and analyzed is considered moderate. The risk of complications and/or morbidity/mortality of patient is considered moderate. Overall, this patient encounter is considered a moderate risk visit.      What are antibiotics?  Antibiotics are medicines that help people fight infections caused by bacteria. They work by killing bacteria that are in the body. These medicines come in many different forms, including pills, ointments, and liquids that are given by injection.    Antibiotics can do a lot of good. For people with serious bacterial infections, antibiotics can save lives. But people use them far too often, even when they're not needed. This is causing a very serious problem called antibiotic resistance. Antibiotic resistance happens when bacteria that have been exposed to an antibiotic change so that the antibiotic can no longer kill them. In those bacteria, the antibiotic has no effect. Because of this problem, doctors are having a harder and harder time treating infections. Experts worry that there will soon be infections that don't respond to any antibiotics.    When are antibiotics helpful?  Antibiotics can help people fight off infections caused by bacteria. They do not work on infections caused by viruses.    Some common bacterial infections that are treated with antibiotics include:    Strep throat    Pneumonia (an infection of the lungs)    Bladder  infections    Infections you catch through sex, such as gonorrhea and chlamydia    When are antibiotics NOT helpful?    Antibiotics do not work on infections caused by viruses.    Antibiotics are not helpful for the common cold, because the common cold is caused by a virus.    Antibiotics are not helpful for the flu, because the flu is caused by a virus. (People with the flu can be treated with medicines called antiviral medicines, which are different from antibiotics.)      Even though antibiotics don't work on infections caused by viruses, people sometimes believe that they do. That's because they took antibiotics for a viral infection before and then got better. The problem is that those people would have gotten better with or without an antibiotic. When they get better with the antibiotic, they think that's what cured them, when in reality the antibiotic had nothing to do with it.    What's the harm in taking antibiotics even if they might not help?  There are many reasons you should not take antibiotics unless you absolutely need them:    Antibiotics cause side effects, such as nausea, vomiting, and diarrhea. They can even make it more likely that you will get a different kind of infection, such as yeast infection (if you are a woman).    Allergies to antibiotics are common. You can develop an allergy to an antibiotic, even if you have not had a problem with it before. Some allergies are just unpleasant, causing rashes and itching. But some can be very serious and even life-threatening. It is better to avoid any risk of an allergy, if the antibiotic wouldn't help you anyway.    Overuse of antibiotics leads to antibiotic resistance. Using antibiotics when they are not needed gives bacteria a chance to change, so that the antibiotics cannot hurt them later on. People who have infections caused by antibiotic-resistant bacteria often have to be treated in the hospital with many different antibiotics. People can  "even die from these infections, because there is no antibiotic that will cure them.    When should I take antibiotics?  You should take antibiotics only when a doctor or nurse prescribes them to you. You should never take antibiotics prescribed to someone else, and you should not take antibiotics that were prescribed to you for a previous illness. When prescribing an antibiotic, doctors and nurses have to carefully pick the right antibiotic for a particular infection. Not all antibiotics work on all bacteria.    If you do have an infection caused by a bacteria, your doctor or nurse might want to find out what that bacteria is, and which antibiotics can kill it. They do this by taking a \"culture\" of the bacteria and growing it in the lab. But it's not possible to do a culture on someone who has already started an antibiotic. So if you start an antibiotic without input from a doctor or nurse it will be impossible to know if you have taken the right one.    What can I do to reduce antibiotic resistance?  Here are some things that can help:    Do not pressure your doctor or nurse for antibiotics when they do not think you need them.    If you are prescribed antibiotics, finish all of the medicine and take it exactly as directed. Never skip doses or stop taking the medicine without talking to your doctor or nurse.    Do not give antibiotics that were prescribed to you to anyone else.    What if my doctor prescribes an antibiotic that did not work for me before?  If an antibiotic did not work for you before, that does not mean it will never work for you. If you have used an antibiotic before and it did not work, tell your doctor. But keep in mind that the infection you had before might not have been caused by the same bacteria that you have now. The \"best\" antibiotic is the right one for the bacteria causing the infection, not for the person with the infection.    What if I am allergic to an antibiotic?  If you had a bad " reaction to an antibiotic, tell your doctor or nurse about it. But do not assume you are allergic unless your doctor or nurse tells you that you are.    Many people who think they are allergic to an antibiotic are wrong. If you get nauseous after taking an antibiotic, that does not mean you are allergic to it. Nausea is a common side effect of many antibiotics. If you are a woman and you get a yeast infection after taking an antibiotic, that does not mean you are allergic to it. Yeast infections are a common side effect of antibiotics.    Symptoms of antibiotic allergy can be mild and include a flat rash and itching. They can also be more serious and include:    Hives - Hives are raised, red patches of skin that are usually very itchy (picture 1).    Lip or tongue swelling    Trouble swallowing or breathing    Serious allergy symptoms can start right after you start taking an antibiotic if you are very sensitive. Less serious symptoms, on the other hand, often start a day or more later.    Almost all antibiotics may cause possible side effects of allergic reaction, nausea, vomiting, diarrhea- most worrisome caused by C. diff, and secondary yeast infection.        Assessment/Plan        1. Pharyngitis, unspecified etiology  amoxicillin (Amoxil) 500 mg capsule    POCT rapid strep A manually resulted            Orders Placed This Encounter   Procedures    POCT rapid strep A manually resulted     Order Specific Question:   [facility]     Answer:   Immediate [1]                 Call if no better or if symptoms worsen

## 2024-04-01 LAB — POC RAPID STREP: NEGATIVE

## 2024-06-03 DIAGNOSIS — E03.9 HYPOTHYROIDISM, UNSPECIFIED TYPE: ICD-10-CM

## 2024-06-04 RX ORDER — LEVOTHYROXINE SODIUM 50 UG/1
50 TABLET ORAL DAILY
Qty: 90 TABLET | Refills: 3 | Status: SHIPPED | OUTPATIENT
Start: 2024-06-04

## 2024-06-28 ENCOUNTER — TELEPHONE (OUTPATIENT)
Dept: PRIMARY CARE | Facility: CLINIC | Age: 59
End: 2024-06-28

## 2024-06-28 DIAGNOSIS — R92.8 ABNORMAL MAMMOGRAM: ICD-10-CM

## 2024-06-28 DIAGNOSIS — R93.89 ABNORMAL CXR (CHEST X-RAY): ICD-10-CM

## 2024-06-28 DIAGNOSIS — J02.9 PHARYNGITIS, UNSPECIFIED ETIOLOGY: Primary | ICD-10-CM

## 2024-06-28 DIAGNOSIS — N60.01 BREAST CYST, RIGHT: ICD-10-CM

## 2024-06-28 NOTE — TELEPHONE ENCOUNTER
Radiology called  They need a new order put in for Bilateral Mommorgrams alfredo also needs an US of the right breast.  She is having this done on Monday.

## 2024-07-01 ENCOUNTER — APPOINTMENT (OUTPATIENT)
Dept: RADIOLOGY | Facility: CLINIC | Age: 59
End: 2024-07-01
Payer: COMMERCIAL

## 2024-07-15 ENCOUNTER — HOSPITAL ENCOUNTER (OUTPATIENT)
Dept: RADIOLOGY | Facility: EXTERNAL LOCATION | Age: 59
Discharge: HOME | End: 2024-07-15

## 2024-07-15 ENCOUNTER — HOSPITAL ENCOUNTER (OUTPATIENT)
Dept: RADIOLOGY | Facility: CLINIC | Age: 59
Discharge: HOME | End: 2024-07-15
Payer: COMMERCIAL

## 2024-07-15 VITALS — WEIGHT: 210 LBS | HEIGHT: 66 IN | BODY MASS INDEX: 33.75 KG/M2

## 2024-07-15 DIAGNOSIS — N60.01 BREAST CYST, RIGHT: ICD-10-CM

## 2024-07-15 DIAGNOSIS — R92.8 ABNORMAL MAMMOGRAM: ICD-10-CM

## 2024-07-15 PROCEDURE — 77062 BREAST TOMOSYNTHESIS BI: CPT | Performed by: STUDENT IN AN ORGANIZED HEALTH CARE EDUCATION/TRAINING PROGRAM

## 2024-07-15 PROCEDURE — 77066 DX MAMMO INCL CAD BI: CPT | Performed by: STUDENT IN AN ORGANIZED HEALTH CARE EDUCATION/TRAINING PROGRAM

## 2024-07-15 PROCEDURE — 77062 BREAST TOMOSYNTHESIS BI: CPT

## 2024-07-15 PROCEDURE — 76642 ULTRASOUND BREAST LIMITED: CPT | Performed by: STUDENT IN AN ORGANIZED HEALTH CARE EDUCATION/TRAINING PROGRAM

## 2024-07-15 PROCEDURE — 76642 ULTRASOUND BREAST LIMITED: CPT | Mod: RT

## 2024-09-13 PROBLEM — E66.9 CLASS 1 OBESITY WITH BODY MASS INDEX (BMI) OF 31.0 TO 31.9 IN ADULT: Status: RESOLVED | Noted: 2023-03-15 | Resolved: 2024-09-13

## 2024-09-13 PROBLEM — E66.811 CLASS 1 OBESITY WITH BODY MASS INDEX (BMI) OF 31.0 TO 31.9 IN ADULT: Status: RESOLVED | Noted: 2023-03-15 | Resolved: 2024-09-13

## 2024-09-13 PROBLEM — E03.9 HYPOTHYROID: Chronic | Status: ACTIVE | Noted: 2023-03-15

## 2024-09-13 PROBLEM — E55.9 VITAMIN D DEFICIENCY: Chronic | Status: ACTIVE | Noted: 2023-03-15

## 2024-09-13 PROBLEM — E66.9 CLASS 1 OBESITY WITH BODY MASS INDEX (BMI) OF 33.0 TO 33.9 IN ADULT: Chronic | Status: ACTIVE | Noted: 2023-03-15

## 2024-09-13 PROBLEM — E66.811 CLASS 1 OBESITY WITH BODY MASS INDEX (BMI) OF 33.0 TO 33.9 IN ADULT: Chronic | Status: ACTIVE | Noted: 2023-03-15

## 2024-09-16 NOTE — PROGRESS NOTES
"Subjective        Caprice Cuenca. Is 59 y.o.. female. Here for follow up office visit-       Chief Complaint   Patient presents with    Follow-up       HPI:    Did pt have DXA done? No  (Ordered 2023)         Follow up for thyroid, vit d, elevated BMI      Pt is doing well      Due for lab       Due Shingrix #2- Pt declines this time  Pt declines flu vaccine      Mammo - had 7/2024       Due check- glucose and lipid       Pt has form       Office Visit on 03/29/2024   Component Date Value Ref Range Status    POC Rapid Strep 04/01/2024 Negative  Negative Final   Lab on 03/22/2024   Component Date Value Ref Range Status    Thyroid Stimulating Hormone 03/22/2024 2.73  0.44 - 3.98 mIU/L Final    Vitamin D, 25-Hydroxy, Total 03/22/2024 25 (L)  30 - 100 ng/mL Final         REVIEW OF SYSTEMS:    Review of Systems         Objective      Vitals:    09/23/24 1237   BP: 110/76   BP Location: Right arm   Patient Position: Sitting   BP Cuff Size: Large adult   Pulse: 66   Resp: 12   Temp: 36.7 °C (98.1 °F)   TempSrc: Temporal   SpO2: 97%   Weight: 98.7 kg (217 lb 8 oz)   Height: 1.674 m (5' 5.9\")                       PHYSICAL:      Patient is alert and oriented x 3 , NAD  HEAD- normocephalic and atraumatic   EYES-conjunctiva-normal, lids - normal  EARS/NOSE- normal external exam   NECK-supple,FROM  CV- RRR without murmur  PULM- CTA bilaterally, normal respiratory effort  RESPIRATORY EFFORT- normal , no retractions or nasal flaring   ABD- normoactive BS's   EXT- no edema,NT  SKIN- no abnormal skin lesions noted  NEURO- no focal deficits  PSYCH- pleasant, normal judgement and insight      BP Readings from Last 3 Encounters:   09/23/24 110/76   03/29/24 119/78   03/22/24 110/78             Wt Readings from Last 3 Encounters:   09/23/24 98.7 kg (217 lb 8 oz)   07/15/24 95.3 kg (210 lb)   03/29/24 97 kg (213 lb 12.8 oz)           BMI Readings from Last 3 Encounters:   09/23/24 35.21 kg/m²   07/15/24 33.91 kg/m²   03/29/24 34.51 kg/m² "               The number and complexity of problems addressed is considered moderate.  The amount and/or complexity of data reviewed and analyzed is considered moderate. The risk of complications and/or morbidity/mortality of patient is considered moderate. Overall, this patient encounter is considered a moderate risk visit.          Assessment/Plan      Assessment & Plan  Hypothyroidism, unspecified type  Uon levothyroxine  Orders:    Follow Up In [facility] - PCP - Established    Vitamin D deficiency  Taking supplements        Class 1 obesity due to excess calories with body mass index (BMI) of 33.0 to 33.9 in adult, unspecified whether serious comorbidity present  Patient's BMI is elevated.  Plan- diet and exercise- BMI is elevated. Need to increase activity on a daily basis especially walking.  Monitor  total calories per day- decrease carbohydrates and fats. Goal - lose 1-2 pounds per week.    Recommend 150 minutes of moderate-intensity exercise as tolerated per week and 2-3 days of resistance, flexibility, and neuromotor exercises per week.    Normal BMI- 18.5-25    Overweight=  BMI 26-29    Obese= BMI 30-39    Morbidly Obese = BMI >40           Screening for diabetes mellitus         Screening for cholesterol level         Postmenopausal         Encounter for screening mammogram for malignant neoplasm of breast         Screening for diabetes mellitus (DM)                   Continue medication      Ordered lab      Follow up in 6 months        Time spent during the office visit includes-    updating and familiarizing myself with patients past medical history, social history, and allergies :  discussing ROS ;  obtaining direct  chief complaint and history of present illness from patient ,  reviewing and reconciling current medication list - both prescription and over the counter medications    clinically examine patient    determine what testing if any is needed to  diagnose the condition/conditions   with which patient is presenting    using medical knowledge to put all of the information together and decide on best course of action to proceed with diagnosis  and  treatment for the presenting problem or problems      Pre-visit planning, chart review, and face-to-face encounter   Discussion of medications  Coordination with office staff for med adjustments   Final documentation of visit        My total time spent caring for the patient for the day of the encounter (before,during, and after) was =     >30     total minutes.    (Prep+during visit+post visit)

## 2024-09-16 NOTE — ASSESSMENT & PLAN NOTE
Patient's BMI is elevated.  Plan- diet and exercise- BMI is elevated. Need to increase activity on a daily basis especially walking.  Monitor  total calories per day- decrease carbohydrates and fats. Goal - lose 1-2 pounds per week.    Recommend 150 minutes of moderate-intensity exercise as tolerated per week and 2-3 days of resistance, flexibility, and neuromotor exercises per week.    Normal BMI- 18.5-25    Overweight=  BMI 26-29    Obese= BMI 30-39    Morbidly Obese = BMI >40

## 2024-09-23 ENCOUNTER — APPOINTMENT (OUTPATIENT)
Dept: PRIMARY CARE | Facility: CLINIC | Age: 59
End: 2024-09-23
Payer: COMMERCIAL

## 2024-09-23 VITALS
SYSTOLIC BLOOD PRESSURE: 110 MMHG | OXYGEN SATURATION: 97 % | RESPIRATION RATE: 12 BRPM | HEIGHT: 66 IN | DIASTOLIC BLOOD PRESSURE: 76 MMHG | HEART RATE: 66 BPM | WEIGHT: 217.5 LBS | TEMPERATURE: 98.1 F | BODY MASS INDEX: 34.96 KG/M2

## 2024-09-23 DIAGNOSIS — Z13.1 SCREENING FOR DIABETES MELLITUS: Chronic | ICD-10-CM

## 2024-09-23 DIAGNOSIS — Z78.0 POSTMENOPAUSAL: Chronic | ICD-10-CM

## 2024-09-23 DIAGNOSIS — Z13.1 SCREENING FOR DIABETES MELLITUS (DM): Chronic | ICD-10-CM

## 2024-09-23 DIAGNOSIS — E03.9 HYPOTHYROIDISM, UNSPECIFIED TYPE: Primary | Chronic | ICD-10-CM

## 2024-09-23 DIAGNOSIS — E66.09 CLASS 1 OBESITY DUE TO EXCESS CALORIES WITH BODY MASS INDEX (BMI) OF 33.0 TO 33.9 IN ADULT, UNSPECIFIED WHETHER SERIOUS COMORBIDITY PRESENT: Chronic | ICD-10-CM

## 2024-09-23 DIAGNOSIS — Z12.31 ENCOUNTER FOR SCREENING MAMMOGRAM FOR MALIGNANT NEOPLASM OF BREAST: Chronic | ICD-10-CM

## 2024-09-23 DIAGNOSIS — E55.9 VITAMIN D DEFICIENCY: Chronic | ICD-10-CM

## 2024-09-23 DIAGNOSIS — Z13.220 SCREENING FOR CHOLESTEROL LEVEL: Chronic | ICD-10-CM

## 2024-09-23 PROCEDURE — 1036F TOBACCO NON-USER: CPT | Performed by: FAMILY MEDICINE

## 2024-09-23 PROCEDURE — 3008F BODY MASS INDEX DOCD: CPT | Performed by: FAMILY MEDICINE

## 2024-09-23 PROCEDURE — 99214 OFFICE O/P EST MOD 30 MIN: CPT | Performed by: FAMILY MEDICINE

## 2024-09-23 ASSESSMENT — PATIENT HEALTH QUESTIONNAIRE - PHQ9
1. LITTLE INTEREST OR PLEASURE IN DOING THINGS: NOT AT ALL
SUM OF ALL RESPONSES TO PHQ9 QUESTIONS 1 AND 2: 0
2. FEELING DOWN, DEPRESSED OR HOPELESS: NOT AT ALL

## 2024-09-30 ENCOUNTER — LAB (OUTPATIENT)
Dept: LAB | Facility: LAB | Age: 59
End: 2024-09-30
Payer: COMMERCIAL

## 2024-09-30 DIAGNOSIS — Z13.1 SCREENING FOR DIABETES MELLITUS (DM): Chronic | ICD-10-CM

## 2024-09-30 DIAGNOSIS — E55.9 VITAMIN D DEFICIENCY: Chronic | ICD-10-CM

## 2024-09-30 DIAGNOSIS — E03.9 HYPOTHYROIDISM, UNSPECIFIED TYPE: Chronic | ICD-10-CM

## 2024-09-30 DIAGNOSIS — Z13.220 SCREENING FOR CHOLESTEROL LEVEL: Chronic | ICD-10-CM

## 2024-09-30 LAB
EST. AVERAGE GLUCOSE BLD GHB EST-MCNC: 111 MG/DL
HBA1C MFR BLD: 5.5 %

## 2024-09-30 PROCEDURE — 36415 COLL VENOUS BLD VENIPUNCTURE: CPT

## 2024-09-30 PROCEDURE — 82306 VITAMIN D 25 HYDROXY: CPT

## 2024-09-30 PROCEDURE — 83036 HEMOGLOBIN GLYCOSYLATED A1C: CPT

## 2024-09-30 PROCEDURE — 84443 ASSAY THYROID STIM HORMONE: CPT

## 2024-09-30 PROCEDURE — 80061 LIPID PANEL: CPT

## 2024-10-01 LAB
25(OH)D3 SERPL-MCNC: 22 NG/ML (ref 30–100)
CHOLEST SERPL-MCNC: 194 MG/DL (ref 0–199)
CHOLESTEROL/HDL RATIO: 3.4
HDLC SERPL-MCNC: 57.8 MG/DL
LDLC SERPL CALC-MCNC: 110 MG/DL
NON HDL CHOLESTEROL: 136 MG/DL (ref 0–149)
TRIGL SERPL-MCNC: 130 MG/DL (ref 0–149)
TSH SERPL-ACNC: 2.4 MIU/L (ref 0.44–3.98)
VLDL: 26 MG/DL (ref 0–40)

## 2024-11-11 ENCOUNTER — HOSPITAL ENCOUNTER (OUTPATIENT)
Dept: RADIOLOGY | Facility: CLINIC | Age: 59
Discharge: HOME | End: 2024-11-11
Payer: COMMERCIAL

## 2024-11-11 DIAGNOSIS — Z78.0 POSTMENOPAUSAL: Chronic | ICD-10-CM

## 2024-11-11 PROCEDURE — 77080 DXA BONE DENSITY AXIAL: CPT | Performed by: STUDENT IN AN ORGANIZED HEALTH CARE EDUCATION/TRAINING PROGRAM

## 2024-11-11 PROCEDURE — 77080 DXA BONE DENSITY AXIAL: CPT

## 2025-03-18 NOTE — PROGRESS NOTES
PRIMARY CARE- OFFICE VISIT         Subjective        Subjective        Caprice Cuenca. Is 60 y.o.. female. Here for follow up office visit-       Chief Complaint   Patient presents with    Follow-up       HPI:        Follow up for thyroid, vit d , elevated BMI       Other medical specialists are involved in patient's care.    Any recent notes that were available were reviewed.    All conditions are monitored, evaluated and assessed regularly.    Patient is compliant with current treatment regimen/medications.    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      Lab on 2024   Component Date Value Ref Range Status    Thyroid Stimulating Hormone 2024 2.40  0.44 - 3.98 mIU/L Final    Vitamin D, 25-Hydroxy, Total 2024 22 (L)  30 - 100 ng/mL Final    Cholesterol 2024 194  0 - 199 mg/dL Final          Age      Desirable   Borderline High   High     0-19 Y     0 - 169       170 - 199     >/= 200    20-24 Y     0 - 189       190 - 224     >/= 225         >24 Y     0 - 199       200 - 239     >/= 240   **All ranges are based on fasting samples. Specific   therapeutic targets will vary based on patient-specific   cardiac risk.    Pediatric guidelines reference:Pediatrics 2011, 128(S5).Adult guidelines reference: NCEP ATPIII Guidelines,LAUREN 2001, 258:2486-97    Venipuncture immediately after or during the administration of Metamizole may lead to falsely low results. Testing should be performed immediately prior to Metamizole dosing.    HDL-Cholesterol 2024 57.8   mg/dL Final      Age       Very Low   Low     Normal    High    0-19 Y    < 35      < 40     40-45     ----  20-24 Y    ----     < 40      >45      ----        >24 Y      ----     < 40     40-60      >60      Cholesterol/HDL Ratio 09/30/2024 3.4   Final      Ref Values  Desirable  < 3.4  High Risk  > 5.0    LDL Calculated 09/30/2024 110 (H)  <=99 mg/dL Final                                Near   Borderline      AGE      Desirable  Optimal    High     High     Very High     0-19 Y     0 - 109     ---    110-129   >/= 130     ----    20-24 Y     0 - 119     ---    120-159   >/= 160     ----      >24 Y     0 -  99   100-129  130-159   160-189     >/=190      VLDL 09/30/2024 26  0 - 40 mg/dL Final    Triglycerides 09/30/2024 130  0 - 149 mg/dL Final       Age         Desirable   Borderline High   High     Very High   0 D-90 D    19 - 174         ----         ----        ----  91 D- 9 Y     0 -  74        75 -  99     >/= 100      ----    10-19 Y     0 -  89        90 - 129     >/= 130      ----    20-24 Y     0 - 114       115 - 149     >/= 150      ----         >24 Y     0 - 149       150 - 199    200- 499    >/= 500    Venipuncture immediately after or during the administration of Metamizole may lead to falsely low results. Testing should be performed immediately prior to Metamizole dosing.    Non HDL Cholesterol 09/30/2024 136  0 - 149 mg/dL Final          Age       Desirable   Borderline High   High     Very High     0-19 Y     0 - 119       120 - 144     >/= 145    >/= 160    20-24 Y     0 - 149       150 - 189     >/= 190      ----         >24 Y    30 mg/dL above LDL Cholesterol goal      Hemoglobin A1C 09/30/2024 5.5  See comment % Final    Estimated Average Glucose 09/30/2024 111  Not Established mg/dL Final   Office Visit on 03/29/2024   Component Date Value Ref Range Status    POC Rapid Strep 04/01/2024 Negative  Negative Final           Social History     Tobacco Use   Smoking Status Never    Passive  "exposure: Past   Smokeless Tobacco Never            Social History     Substance and Sexual Activity   Alcohol Use Yes    Alcohol/week: 2.0 standard drinks of alcohol    Types: 2 Glasses of wine per week            Family History   Problem Relation Name Age of Onset    Leukemia Mother      Colon cancer Mother      Hypertension Father              Cancer-related family history includes Colon cancer in her mother.           The 10-year ASCVD risk score (Mukund CRENSHAW, et al., 2019) is: 3.5%    Values used to calculate the score:      Age: 60 years      Sex: Female      Is Non- : No      Diabetic: No      Tobacco smoker: No      Systolic Blood Pressure: 128 mmHg      Is BP treated: No      HDL Cholesterol: 57.8 mg/dL      Total Cholesterol: 194 mg/dL       Lifestyle and medical management to decrease cardiovascular risks.      The following portions of the patient's chart were reviewed in this encounter and updated as appropriate:  Tobacco  Allergies  Meds  Problems  Med Hx  Surg Hx  Fam Hx       REVIEW OF SYSTEMS:    Review of Systems       Objective          Objective      Vitals:    03/25/25 0746   BP: 128/85   BP Location: Left arm   Patient Position: Sitting   BP Cuff Size: Adult long   Pulse: 69   Resp: 14   Temp: 35.5 °C (95.9 °F)   TempSrc: Temporal   SpO2: 98%   Weight: 98.9 kg (218 lb)   Height: 1.651 m (5' 5\")                       PHYSICAL:      Patient is alert and oriented x 3 , NAD  HEAD- normocephalic and atraumatic   EYES-conjunctiva-normal, lids - normal  EARS/NOSE- normal external exam   NECK-supple,FROM  CV- RRR without murmur  PULM- CTA bilaterally, normal respiratory effort  RESPIRATORY EFFORT- normal , no retractions or nasal flaring   ABD- normoactive BS's   EXT- no edema,NT  SKIN- no abnormal skin lesions noted  NEURO- no focal deficits  PSYCH- pleasant, normal judgement and insight      BP Readings from Last 3 Encounters:   03/25/25 128/85   09/23/24 110/76   03/29/24 " 119/78             Wt Readings from Last 3 Encounters:   03/25/25 98.9 kg (218 lb)   09/23/24 98.7 kg (217 lb 8 oz)   07/15/24 95.3 kg (210 lb)           BMI Readings from Last 3 Encounters:   03/25/25 36.28 kg/m²   09/23/24 35.21 kg/m²   07/15/24 33.91 kg/m²               The number and complexity of problems addressed is considered moderate.  The amount and/or complexity of data reviewed and analyzed is considered moderate. The risk of complications and/or morbidity/mortality of patient is considered moderate. Overall, this patient encounter is considered a moderate risk visit.        Assessment/Plan        Assessment/Plan      Assessment & Plan  Hypothyroidism, unspecified type  levothyroxine       Vitamin D deficiency  Taking supplements        Postmenopausal  Last DXA normal  Taking vit d        Need for shingles vaccine         Class 2 obesity due to excess calories with body mass index (BMI) of 36.0 to 36.9 in adult, unspecified whether serious comorbidity present  Patient's BMI is elevated.  Plan- diet and exercise- BMI is elevated. Need to increase activity on a daily basis especially walking.  Monitor  total calories per day- decrease carbohydrates and fats. Goal - lose 1-2 pounds per week.    Recommend 150 minutes of moderate-intensity exercise as tolerated per week and 2-3 days of resistance, flexibility, and neuromotor exercises per week.    Normal BMI- 18.5-25    Overweight=  BMI 26-29    Obese= BMI 30-39    Morbidly Obese = BMI >40                     Continue medication      Ordered lab      Follow up in 6 months- PE and follow up         Time spent during the office visit includes-    updating and familiarizing myself with patients past medical history, social history, and allergies :  discussing ROS ;  obtaining direct  chief complaint and history of present illness from patient ,  reviewing and reconciling current medication list - both prescription and over the counter medications    clinically  examine patient    determine what testing if any is needed to  diagnose the condition/conditions  with which patient is presenting    using medical knowledge to put all of the information together and decide on best course of action to proceed with diagnosis  and  treatment for the presenting problem or problems      Pre-visit planning, chart review, and face-to-face encounter   Discussion of medications  Coordination with office staff for med adjustments   Final documentation of visit        My total time spent caring for the patient for the day of the encounter (before,during, and after) was =     >30     total minutes.    (Prep+during visit+post visit)

## 2025-03-25 ENCOUNTER — APPOINTMENT (OUTPATIENT)
Dept: PRIMARY CARE | Facility: CLINIC | Age: 60
End: 2025-03-25
Payer: COMMERCIAL

## 2025-03-25 VITALS
WEIGHT: 218 LBS | SYSTOLIC BLOOD PRESSURE: 128 MMHG | OXYGEN SATURATION: 98 % | BODY MASS INDEX: 36.32 KG/M2 | TEMPERATURE: 95.9 F | DIASTOLIC BLOOD PRESSURE: 85 MMHG | HEART RATE: 69 BPM | HEIGHT: 65 IN | RESPIRATION RATE: 14 BRPM

## 2025-03-25 DIAGNOSIS — Z23 NEED FOR SHINGLES VACCINE: ICD-10-CM

## 2025-03-25 DIAGNOSIS — Z78.0 POSTMENOPAUSAL: Chronic | ICD-10-CM

## 2025-03-25 DIAGNOSIS — E03.9 HYPOTHYROIDISM, UNSPECIFIED TYPE: Primary | Chronic | ICD-10-CM

## 2025-03-25 DIAGNOSIS — E66.812 CLASS 2 OBESITY DUE TO EXCESS CALORIES WITH BODY MASS INDEX (BMI) OF 36.0 TO 36.9 IN ADULT, UNSPECIFIED WHETHER SERIOUS COMORBIDITY PRESENT: ICD-10-CM

## 2025-03-25 DIAGNOSIS — E66.09 CLASS 2 OBESITY DUE TO EXCESS CALORIES WITH BODY MASS INDEX (BMI) OF 36.0 TO 36.9 IN ADULT, UNSPECIFIED WHETHER SERIOUS COMORBIDITY PRESENT: ICD-10-CM

## 2025-03-25 DIAGNOSIS — E55.9 VITAMIN D DEFICIENCY: Chronic | ICD-10-CM

## 2025-03-25 PROCEDURE — 3008F BODY MASS INDEX DOCD: CPT | Performed by: FAMILY MEDICINE

## 2025-03-25 PROCEDURE — 1036F TOBACCO NON-USER: CPT | Performed by: FAMILY MEDICINE

## 2025-03-25 PROCEDURE — 99214 OFFICE O/P EST MOD 30 MIN: CPT | Performed by: FAMILY MEDICINE

## 2025-03-25 RX ORDER — LEVOTHYROXINE SODIUM 50 UG/1
50 TABLET ORAL DAILY
Qty: 90 TABLET | Refills: 3 | Status: CANCELLED | OUTPATIENT
Start: 2025-03-25

## 2025-03-25 ASSESSMENT — COLUMBIA-SUICIDE SEVERITY RATING SCALE - C-SSRS
2. HAVE YOU ACTUALLY HAD ANY THOUGHTS OF KILLING YOURSELF?: NO
1. IN THE PAST MONTH, HAVE YOU WISHED YOU WERE DEAD OR WISHED YOU COULD GO TO SLEEP AND NOT WAKE UP?: NO
6. HAVE YOU EVER DONE ANYTHING, STARTED TO DO ANYTHING, OR PREPARED TO DO ANYTHING TO END YOUR LIFE?: NO

## 2025-03-25 ASSESSMENT — ENCOUNTER SYMPTOMS
OCCASIONAL FEELINGS OF UNSTEADINESS: 0
LOSS OF SENSATION IN FEET: 0
DEPRESSION: 0

## 2025-03-27 LAB
25(OH)D3+25(OH)D2 SERPL-MCNC: 30 NG/ML (ref 30–100)
TSH SERPL-ACNC: 1.91 MIU/L (ref 0.4–4.5)

## 2025-03-31 DIAGNOSIS — E03.9 HYPOTHYROIDISM, UNSPECIFIED TYPE: ICD-10-CM

## 2025-03-31 RX ORDER — LEVOTHYROXINE SODIUM 50 UG/1
50 TABLET ORAL DAILY
Qty: 90 TABLET | Refills: 3 | Status: SHIPPED | OUTPATIENT
Start: 2025-03-31

## 2025-09-25 ENCOUNTER — APPOINTMENT (OUTPATIENT)
Dept: PRIMARY CARE | Facility: CLINIC | Age: 60
End: 2025-09-25
Payer: COMMERCIAL